# Patient Record
Sex: FEMALE | Race: WHITE | NOT HISPANIC OR LATINO | Employment: FULL TIME | URBAN - NONMETROPOLITAN AREA
[De-identification: names, ages, dates, MRNs, and addresses within clinical notes are randomized per-mention and may not be internally consistent; named-entity substitution may affect disease eponyms.]

---

## 2017-07-19 ENCOUNTER — OFFICE VISIT (OUTPATIENT)
Dept: URGENT CARE | Facility: PHYSICIAN GROUP | Age: 32
End: 2017-07-19
Payer: MEDICAID

## 2017-07-19 VITALS
RESPIRATION RATE: 16 BRPM | OXYGEN SATURATION: 96 % | SYSTOLIC BLOOD PRESSURE: 104 MMHG | DIASTOLIC BLOOD PRESSURE: 70 MMHG | WEIGHT: 215 LBS | HEART RATE: 86 BPM | TEMPERATURE: 97.4 F

## 2017-07-19 DIAGNOSIS — Z72.0 TOBACCO USE: ICD-10-CM

## 2017-07-19 DIAGNOSIS — S46.911A RIGHT SHOULDER STRAIN, INITIAL ENCOUNTER: ICD-10-CM

## 2017-07-19 PROCEDURE — 99406 BEHAV CHNG SMOKING 3-10 MIN: CPT | Performed by: PHYSICIAN ASSISTANT

## 2017-07-19 PROCEDURE — 99203 OFFICE O/P NEW LOW 30 MIN: CPT | Mod: 25 | Performed by: PHYSICIAN ASSISTANT

## 2017-07-19 RX ORDER — CYCLOBENZAPRINE HCL 10 MG
10 TABLET ORAL 3 TIMES DAILY PRN
Qty: 15 TAB | Refills: 0 | Status: SHIPPED | OUTPATIENT
Start: 2017-07-19 | End: 2019-02-27

## 2017-07-19 ASSESSMENT — ENCOUNTER SYMPTOMS
NUMBNESS: 0
BACK PAIN: 1
SENSORY CHANGE: 0
FALLS: 0
TINGLING: 0
MUSCLE WEAKNESS: 0
NECK PAIN: 0
FOCAL WEAKNESS: 0

## 2017-07-19 NOTE — PROGRESS NOTES
Subjective:      Alicia Villegas is a 32 y.o. female who presents with Shoulder Injury            HPI Comments: Patient reports that she was lifting a box yesterday when she felt a pop in her right shoulder. She reports pain throughout the shoulder and right upper back. Pain is worse with movement, palpation, and especially when lifting the right arm overhead. Pain has improved slightly since yesterday but is still bothersome. Also reports some spasms in her right shoulder and neck.    Shoulder Injury   The incident occurred at home. The right shoulder is affected. The incident occurred 12 to 24 hours ago. Injury mechanism: lifting. The quality of the pain is described as aching, cramping and stabbing. The pain does not radiate. The pain is moderate. Pertinent negatives include no chest pain, muscle weakness, numbness or tingling. The symptoms are aggravated by movement, palpation and overhead lifting. She has tried acetaminophen, NSAIDs and rest for the symptoms. The treatment provided mild relief.       Review of Systems   Cardiovascular: Negative for chest pain.   Musculoskeletal: Positive for back pain. Negative for falls and neck pain.   Neurological: Negative for tingling, sensory change, focal weakness and numbness.     Allergies:Review of patient's allergies indicates no known allergies.    Current Outpatient Prescriptions Ordered in [facility]   Medication Sig Dispense Refill   • cyclobenzaprine (FLEXERIL) 10 MG Tab Take 1 Tab by mouth 3 times a day as needed. 15 Tab 0     No current Epic-ordered facility-administered medications on file.       History reviewed. No pertinent past medical history.    Social History   Substance Use Topics   • Smoking status: Current Every Day Smoker     Types: Cigarettes   • Smokeless tobacco: Never Used   • Alcohol Use: Yes       No family status information on file.   History reviewed. No pertinent family history.       Objective:     /70 mmHg  Pulse 86  Temp(Src) 36.3 °C  (97.4 °F)  Resp 16  Wt 97.523 kg (215 lb)  SpO2 96%     Physical Exam   Constitutional: She is oriented to person, place, and time. She appears well-developed and well-nourished. No distress.   HENT:   Head: Normocephalic and atraumatic.   Eyes: Right eye exhibits no discharge. Left eye exhibits no discharge.   Neck: Normal range of motion. Neck supple.   Cardiovascular: Normal rate.    Pulmonary/Chest: Effort normal.   Musculoskeletal:   Mild to moderate muscular tenderness of the posterior aspect of the right shoulder and right trapezius   Neurological: She is alert and oriented to person, place, and time.   Skin: Skin is warm and dry. She is not diaphoretic.   Psychiatric: She has a normal mood and affect. Her behavior is normal. Judgment and thought content normal.   Nursing note and vitals reviewed.              Assessment/Plan:     1. Right shoulder strain, initial encounter  cyclobenzaprine (FLEXERIL) 10 MG Tab    Muscular tenderness of the posterior shoulder and upper back. No bony tenderness. Likely strain. NSAIDs, heat, massage, Flexeril.   2. Tobacco use  REFERRAL TO TOBACCO CESSATION PROGRAM    Chronic problem. Discussed smoking cessation and given referral to tobacco cessation program.   Ready to quit: Yes  Counseling given: Yes        [employer] Interactive Patient Education given: Shoulder pain, smoking cessation    Please note that this dictation was created using voice recognition software. I have made every reasonable attempt to correct obvious errors, but I expect that there are errors of grammar and possibly content that I did not discover before finalizing the note.

## 2017-07-19 NOTE — PATIENT INSTRUCTIONS
Shoulder Pain  The shoulder is the joint that connects your arms to your body. The bones that form the shoulder joint include the upper arm bone (humerus), the shoulder blade (scapula), and the collarbone (clavicle). The top of the humerus is shaped like a ball and fits into a rather flat socket on the scapula (glenoid cavity). A combination of muscles and strong, fibrous tissues that connect muscles to bones (tendons) support your shoulder joint and hold the ball in the socket. Small, fluid-filled sacs (bursae) are located in different areas of the joint. They act as cushions between the bones and the overlying soft tissues and help reduce friction between the gliding tendons and the bone as you move your arm. Your shoulder joint allows a wide range of motion in your arm. This range of motion allows you to do things like scratch your back or throw a ball. However, this range of motion also makes your shoulder more prone to pain from overuse and injury.  Causes of shoulder pain can originate from both injury and overuse and usually can be grouped in the following four categories:  · Redness, swelling, and pain (inflammation) of the tendon (tendinitis) or the bursae (bursitis).  · Instability, such as a dislocation of the joint.  · Inflammation of the joint (arthritis).  · Broken bone (fracture).  HOME CARE INSTRUCTIONS   · Apply ice to the sore area.  ¨ Put ice in a plastic bag.  ¨ Place a towel between your skin and the bag.  ¨ Leave the ice on for 15-20 minutes, 3-4 times per day for the first 2 days, or as directed by your health care provider.  · Stop using cold packs if they do not help with the pain.  · If you have a shoulder sling or immobilizer, wear it as long as your caregiver instructs. Only remove it to shower or bathe. Move your arm as little as possible, but keep your hand moving to prevent swelling.  · Squeeze a soft ball or foam pad as much as possible to help prevent swelling.  · Only take  over-the-counter or prescription medicines for pain, discomfort, or fever as directed by your caregiver.  SEEK MEDICAL CARE IF:   · Your shoulder pain increases, or new pain develops in your arm, hand, or fingers.  · Your hand or fingers become cold and numb.  · Your pain is not relieved with medicines.  SEEK IMMEDIATE MEDICAL CARE IF:   · Your arm, hand, or fingers are numb or tingling.  · Your arm, hand, or fingers are significantly swollen or turn white or blue.  MAKE SURE YOU:   · Understand these instructions.  · Will watch your condition.  · Will get help right away if you are not doing well or get worse.     This information is not intended to replace advice given to you by your health care provider. Make sure you discuss any questions you have with your health care provider.     Document Released: 09/27/2006 Document Revised: 01/08/2016 Document Reviewed: 04/11/2016  [employer] Interactive Patient Education ©2016 [employer]      Smoking Cessation, Tips for Success  If you are ready to quit smoking, congratulations! You have chosen to help yourself be healthier. Cigarettes bring nicotine, tar, carbon monoxide, and other irritants into your body. Your lungs, heart, and blood vessels will be able to work better without these poisons. There are many different ways to quit smoking. Nicotine gum, nicotine patches, a nicotine inhaler, or nicotine nasal spray can help with physical craving. Hypnosis, support groups, and medicines help break the habit of smoking.  WHAT THINGS CAN I DO TO MAKE QUITTING EASIER?   Here are some tips to help you quit for good:  · Pick a date when you will quit smoking completely. Tell all of your friends and family about your plan to quit on that date.  · Do not try to slowly cut down on the number of cigarettes you are smoking. Pick a quit date and quit smoking completely starting on that day.  · Throw away all cigarettes.    · Clean and remove all ashtrays from your home, work, and  "car.  · On a card, write down your reasons for quitting. Carry the card with you and read it when you get the urge to smoke.  · Cleanse your body of nicotine. Drink enough water and fluids to keep your urine clear or pale yellow. Do this after quitting to flush the nicotine from your body.  · Learn to predict your moods. Do not let a bad situation be your excuse to have a cigarette. Some situations in your life might tempt you into wanting a cigarette.  · Never have \"just one\" cigarette. It leads to wanting another and another. Remind yourself of your decision to quit.  · Change habits associated with smoking. If you smoked while driving or when feeling stressed, try other activities to replace smoking. Stand up when drinking your coffee. Brush your teeth after eating. Sit in a different chair when you read the paper. Avoid alcohol while trying to quit, and try to drink fewer caffeinated beverages. Alcohol and caffeine may urge you to smoke.  · Avoid foods and drinks that can trigger a desire to smoke, such as sugary or spicy foods and alcohol.  · Ask people who smoke not to smoke around you.  · Have something planned to do right after eating or having a cup of coffee. For example, plan to take a walk or exercise.  · Try a relaxation exercise to calm you down and decrease your stress. Remember, you may be tense and nervous for the first 2 weeks after you quit, but this will pass.  · Find new activities to keep your hands busy. Play with a pen, coin, or rubber band. Doodle or draw things on paper.  · Brush your teeth right after eating. This will help cut down on the craving for the taste of tobacco after meals. You can also try mouthwash.    · Use oral substitutes in place of cigarettes. Try using lemon drops, carrots, cinnamon sticks, or chewing gum. Keep them handy so they are available when you have the urge to smoke.  · When you have the urge to smoke, try deep breathing.  · Designate your home as a nonsmoking " "area.  · If you are a heavy smoker, ask your health care provider about a prescription for nicotine chewing gum. It can ease your withdrawal from nicotine.  · Reward yourself. Set aside the cigarette money you save and buy yourself something nice.  · Look for support from others. Join a support group or smoking cessation program. Ask someone at home or at work to help you with your plan to quit smoking.  · Always ask yourself, \"Do I need this cigarette or is this just a reflex?\" Tell yourself, \"Today, I choose not to smoke,\" or \"I do not want to smoke.\" You are reminding yourself of your decision to quit.  · Do not replace cigarette smoking with electronic cigarettes (commonly called e-cigarettes). The safety of e-cigarettes is unknown, and some may contain harmful chemicals.  · If you relapse, do not give up! Plan ahead and think about what you will do the next time you get the urge to smoke.  HOW WILL I FEEL WHEN I QUIT SMOKING?  You may have symptoms of withdrawal because your body is used to nicotine (the addictive substance in cigarettes). You may crave cigarettes, be irritable, feel very hungry, cough often, get headaches, or have difficulty concentrating. The withdrawal symptoms are only temporary. They are strongest when you first quit but will go away within 10-14 days. When withdrawal symptoms occur, stay in control. Think about your reasons for quitting. Remind yourself that these are signs that your body is healing and getting used to being without cigarettes. Remember that withdrawal symptoms are easier to treat than the major diseases that smoking can cause.   Even after the withdrawal is over, expect periodic urges to smoke. However, these cravings are generally short lived and will go away whether you smoke or not. Do not smoke!  WHAT RESOURCES ARE AVAILABLE TO HELP ME QUIT SMOKING?  Your health care provider can direct you to community resources or hospitals for support, which may include:  · Group " support.  · Education.  · Hypnosis.  · Therapy.     This information is not intended to replace advice given to you by your health care provider. Make sure you discuss any questions you have with your health care provider.     Document Released: 09/15/2005 Document Revised: 01/08/2016 Document Reviewed: 06/05/2014  [employer] Interactive Patient Education ©2016 [employer]

## 2017-07-19 NOTE — MR AVS SNAPSHOT
Alicia Villegas   2017 1:55 PM   Office Visit   MRN: [id number]    Department:  [facility]   Dept Phone:  [phone]    Description:  Female : 1985   Provider:  LUIS CARLOS Garcia           Reason for Visit     Shoulder Injury R/ shoulder, Pt felt pop while lifting yesterday      Allergies as of 2017     No Known Allergies      You were diagnosed with     Right shoulder strain, initial encounter   [[id number]]   Muscular tenderness of the posterior shoulder and upper back. No bony tenderness. Likely strain. NSAIDs, heat, massage, Flexeril.    Tobacco use   [[id number]]         Vital Signs     Blood Pressure Pulse Temperature Respirations Weight Oxygen Saturation    104/70 mmHg 86 36.3 °C (97.4 °F) 16 97.523 kg (215 lb) 96%    Smoking Status                   Current Every Day Smoker           Basic Information     Date Of Birth Sex Race Ethnicity Preferred Language    1985 Female White Non- English      Health Maintenance     Patient has no pending health maintenance at this time      Current Immunizations     No immunizations on file.      Below and/or attached are the medications your provider expects you to take. Review all of your home medications and newly ordered medications with your provider and/or pharmacist. Follow medication instructions as directed by your provider and/or pharmacist. Please keep your medication list with you and share with your provider. Update the information when medications are discontinued, doses are changed, or new medications (including over-the-counter products) are added; and carry medication information at all times in the event of emergency situations     Allergies:  No Known Allergies          Medications  Valid as of: 2017 -  2:07 PM    Generic Name Brand Name Tablet Size Instructions for use    Cyclobenzaprine HCl (Tab) FLEXERIL 10 MG Take 1 Tab by mouth 3 times a day as needed.        .                 Medicines prescribed  today were sent to:     [facility] [address] [place] [address], [place] - [address] [facility]    [address] [facility] [place] [address]    Phone: [phone] Fax: [phone]    Open 24 Hours?: No      Medication refill instructions:       If your prescription bottle indicates you have medication refills left, it is not necessary to call your provider’s office. Please contact your pharmacy and they will refill your medication.    If your prescription bottle indicates you do not have any refills left, you may request refills at any time through one of the following ways: The online [employer] [facility] system (except Urgent Care), by calling your provider’s office, or by asking your pharmacy to contact your provider’s office with a refill request. Medication refills are processed only during regular business hours and may not be available until the next business day. Your provider may request additional information or to have a follow-up visit with you prior to refilling your medication.   *Please Note: Medication refills are assigned a new Rx number when refilled electronically. Your pharmacy may indicate that no refills were authorized even though a new prescription for the same medication is available at the pharmacy. Please request the medicine by name with the pharmacy before contacting your provider for a refill.        Referral     A referral request has been sent to our patient care coordination department. Please allow 3-5 business days for us to process this request and contact you either by phone or mail. If you do not hear from us by the 5th business day, please call us at [phone].        Instructions    Smoking Cessation, Tips for Success  If you are ready to quit smoking, congratulations! You have chosen to help yourself be healthier. Cigarettes bring nicotine, tar, carbon monoxide, and other irritants into your body. Your lungs, heart, and blood vessels will be able to work better without these poisons. There are many  "different ways to quit smoking. Nicotine gum, nicotine patches, a nicotine inhaler, or nicotine nasal spray can help with physical craving. Hypnosis, support groups, and medicines help break the habit of smoking.  WHAT THINGS CAN I DO TO MAKE QUITTING EASIER?   Here are some tips to help you quit for good:  · Pick a date when you will quit smoking completely. Tell all of your friends and family about your plan to quit on that date.  · Do not try to slowly cut down on the number of cigarettes you are smoking. Pick a quit date and quit smoking completely starting on that day.  · Throw away all cigarettes.    · Clean and remove all ashtrays from your home, work, and car.  · On a card, write down your reasons for quitting. Carry the card with you and read it when you get the urge to smoke.  · Cleanse your body of nicotine. Drink enough water and fluids to keep your urine clear or pale yellow. Do this after quitting to flush the nicotine from your body.  · Learn to predict your moods. Do not let a bad situation be your excuse to have a cigarette. Some situations in your life might tempt you into wanting a cigarette.  · Never have \"just one\" cigarette. It leads to wanting another and another. Remind yourself of your decision to quit.  · Change habits associated with smoking. If you smoked while driving or when feeling stressed, try other activities to replace smoking. Stand up when drinking your coffee. Brush your teeth after eating. Sit in a different chair when you read the paper. Avoid alcohol while trying to quit, and try to drink fewer caffeinated beverages. Alcohol and caffeine may urge you to smoke.  · Avoid foods and drinks that can trigger a desire to smoke, such as sugary or spicy foods and alcohol.  · Ask people who smoke not to smoke around you.  · Have something planned to do right after eating or having a cup of coffee. For example, plan to take a walk or exercise.  · Try a relaxation exercise to calm you " "down and decrease your stress. Remember, you may be tense and nervous for the first 2 weeks after you quit, but this will pass.  · Find new activities to keep your hands busy. Play with a pen, coin, or rubber band. Doodle or draw things on paper.  · Brush your teeth right after eating. This will help cut down on the craving for the taste of tobacco after meals. You can also try mouthwash.    · Use oral substitutes in place of cigarettes. Try using lemon drops, carrots, cinnamon sticks, or chewing gum. Keep them handy so they are available when you have the urge to smoke.  · When you have the urge to smoke, try deep breathing.  · Designate your home as a nonsmoking area.  · If you are a heavy smoker, ask your health care provider about a prescription for nicotine chewing gum. It can ease your withdrawal from nicotine.  · Reward yourself. Set aside the cigarette money you save and buy yourself something nice.  · Look for support from others. Join a support group or smoking cessation program. Ask someone at home or at work to help you with your plan to quit smoking.  · Always ask yourself, \"Do I need this cigarette or is this just a reflex?\" Tell yourself, \"Today, I choose not to smoke,\" or \"I do not want to smoke.\" You are reminding yourself of your decision to quit.  · Do not replace cigarette smoking with electronic cigarettes (commonly called e-cigarettes). The safety of e-cigarettes is unknown, and some may contain harmful chemicals.  · If you relapse, do not give up! Plan ahead and think about what you will do the next time you get the urge to smoke.  HOW WILL I FEEL WHEN I QUIT SMOKING?  You may have symptoms of withdrawal because your body is used to nicotine (the addictive substance in cigarettes). You may crave cigarettes, be irritable, feel very hungry, cough often, get headaches, or have difficulty concentrating. The withdrawal symptoms are only temporary. They are strongest when you first quit but will go " away within 10-14 days. When withdrawal symptoms occur, stay in control. Think about your reasons for quitting. Remind yourself that these are signs that your body is healing and getting used to being without cigarettes. Remember that withdrawal symptoms are easier to treat than the major diseases that smoking can cause.   Even after the withdrawal is over, expect periodic urges to smoke. However, these cravings are generally short lived and will go away whether you smoke or not. Do not smoke!  WHAT RESOURCES ARE AVAILABLE TO HELP ME QUIT SMOKING?  Your health care provider can direct you to community resources or hospitals for support, which may include:  · Group support.  · Education.  · Hypnosis.  · Therapy.     This information is not intended to replace advice given to you by your health care provider. Make sure you discuss any questions you have with your health care provider.     Document Released: 09/15/2005 Document Revised: 01/08/2016 Document Reviewed: 06/05/2014  [employer] Interactive Patient Education ©2016 Elsevier Inc.            Proximus Access Code: [email or web address]  Expires: 8/18/2017  2:07 PM    Proximus  A secure, online tool to manage your health information     [employer]’s Proximus® is a secure, online tool that connects you to your personalized health information from the privacy of your home -- day or night - making it very easy for you to manage your healthcare. Once the activation process is completed, you can even access your medical information using the Proximus jordy, which is available for free in the Apple Jordy store or Google Play store.     Proximus provides the following levels of access (as shown below):   My Chart Features   [facility] Primary [facility] Doctor [facility]  Specialists [facility]  Urgent  Care Non-[facility]  Primary Care  Doctor   Email your healthcare team securely and privately 24/7 X X X    Manage appointments: schedule your next appointment; view details of past/upcoming appointments  X      Request prescription refills. X      View recent personal medical records, including lab and immunizations X X X X   View health record, including health history, allergies, medications X X X X   Read reports about your outpatient visits, procedures, consult and ER notes X X X X   See your discharge summary, which is a recap of your hospital and/or ER visit that includes your diagnosis, lab results, and care plan. X X       How to register for [employer] Online:  1. Go to  [email or web address].  2. Click on the Sign Up Now box, which takes you to the New Member Sign Up page. You will need to provide the following information:  a. Enter your [employer] Online Access Code exactly as it appears at the top of this page. (You will not need to use this code after you’ve completed the sign-up process. If you do not sign up before the expiration date, you must request a new code.)   b. Enter your date of birth.   c. Enter your home email address.   d. Click Submit, and follow the next screen’s instructions.  3. Create a [employer]a Online ID. This will be your [employer] Online login ID and cannot be changed, so think of one that is secure and easy to remember.  4. Create a [employer] Online password. You can change your password at any time.  5. Enter your Password Reset Question and Answer. This can be used at a later time if you forget your password.   6. Enter your e-mail address. This allows you to receive e-mail notifications when new information is available in [employer] Online.  7. Click Sign Up. You can now view your health information.    For assistance activating your [employer] Online account, call [phone]        Quit Tobacco Information     Do you want to quit using tobacco?    Quitting tobacco decreases risks of cancer, heart and lung disease, increases life expectancy, improves sense of taste and smell, and increases spending money, among other benefits.    If you are thinking about quitting, we can help.  • [employer] Quit Tobacco Program:  691.948.7663  o Program occurs weekly for four weeks and includes pharmacist consultation on products to support quitting smoking or chewing tobacco. A provider referral is needed for pharmacist consultation.  • Tobacco Users Help Hotline: 8-800QUIT-NOW (306-6123) or https://[place].quitlogix.org/  o Free, confidential telephone and online coaching for [place] residents. Sessions are designed on a schedule that is convenient for you. Eligible clients receive free nicotine replacement therapy.  • Nationally: www.smokefree.gov  o Information and professional assistance to support both immediate and long-term needs as you become, and remain, a non-smoker. Smokefree.gov allows you to choose the help that best fits your needs.

## 2017-07-19 NOTE — Clinical Note
July 19, 2017         Patient: Alicia Villegas   YOB: 1985   Date of Visit: 7/19/2017           To Whom it May Concern:    Alicia Villegas was seen in my clinic on 7/19/2017. Please excuse from work today.    If you have any questions or concerns, please don't hesitate to call.        Sincerely,           Malik Barragan PA-C  Electronically Signed

## 2018-11-16 ENCOUNTER — OFFICE VISIT (OUTPATIENT)
Dept: URGENT CARE | Facility: CLINIC | Age: 33
End: 2018-11-16

## 2018-11-16 VITALS
TEMPERATURE: 97.9 F | OXYGEN SATURATION: 97 % | RESPIRATION RATE: 14 BRPM | HEART RATE: 72 BPM | DIASTOLIC BLOOD PRESSURE: 70 MMHG | SYSTOLIC BLOOD PRESSURE: 124 MMHG | HEIGHT: 67 IN | BODY MASS INDEX: 26.21 KG/M2 | WEIGHT: 167 LBS

## 2018-11-16 DIAGNOSIS — Z02.1 PRE-EMPLOYMENT DRUG SCREENING: ICD-10-CM

## 2018-11-16 DIAGNOSIS — Z02.1 PHYSICAL EXAM, PRE-EMPLOYMENT: ICD-10-CM

## 2018-11-16 PROCEDURE — 8915 PR COMPREHENSIVE PHYSICAL: Performed by: NURSE PRACTITIONER

## 2018-11-16 PROCEDURE — 8907 PR URINE COLLECT ONLY: Performed by: NURSE PRACTITIONER

## 2018-11-16 ASSESSMENT — ENCOUNTER SYMPTOMS
SPUTUM PRODUCTION: 0
BACK PAIN: 0
DIAPHORESIS: 0
WEAKNESS: 0
PALPITATIONS: 0
HEMOPTYSIS: 0
WHEEZING: 0
SHORTNESS OF BREATH: 0
ORTHOPNEA: 0
COUGH: 0
MYALGIAS: 0
NECK PAIN: 0
FEVER: 0
CHILLS: 0

## 2018-11-16 NOTE — PROGRESS NOTES
"Subjective:      Alicia Villegas is a 33 y.o. female who presents with Employment Physical            Patient comes in today for a pre-employment physical.  Patient denies any history of asthma, cardiovascular problems, seizures or syncope.  Surgical history: umbilical hernia repair and tubal ligation.  Denies any history of broken bones.  Smoker.  Reports she is in general good health and does not anticipate any difficulty performing expected job duties.          Review of Systems   Constitutional: Negative for chills, diaphoresis, fever and malaise/fatigue.   Respiratory: Negative for cough, hemoptysis, sputum production, shortness of breath and wheezing.    Cardiovascular: Negative for chest pain, palpitations, orthopnea and leg swelling.   Musculoskeletal: Negative for back pain, joint pain, myalgias and neck pain.   Neurological: Negative for weakness.     Medications, Allergies, and current problem list reviewed today in Epic     Objective:     /70 (BP Location: Left arm, Patient Position: Sitting, BP Cuff Size: Large adult)   Pulse 72   Temp 36.6 °C (97.9 °F) (Temporal)   Resp 14   Ht 1.702 m (5' 7\")   Wt 75.8 kg (167 lb)   SpO2 97%   BMI 26.16 kg/m²      Physical Exam   Constitutional: She is oriented to person, place, and time. She appears well-developed and well-nourished. No distress.   HENT:   Head: Normocephalic.   Right Ear: External ear normal.   Left Ear: External ear normal.   Nose: Nose normal.   Mouth/Throat: Oropharynx is clear and moist. No oropharyngeal exudate.   Eyes: Pupils are equal, round, and reactive to light. Conjunctivae and EOM are normal. Right eye exhibits no discharge. Left eye exhibits no discharge. No scleral icterus.   Neck: Normal range of motion. Neck supple. No JVD present. No tracheal deviation present. No thyromegaly present.   Cardiovascular: Normal rate, regular rhythm and normal heart sounds.  Exam reveals no gallop and no friction rub.    No murmur " heard.  Pulmonary/Chest: Effort normal and breath sounds normal. No stridor. No respiratory distress. She has no wheezes. She has no rales. She exhibits no tenderness.   Abdominal: Soft. Bowel sounds are normal. She exhibits no distension and no mass. There is no tenderness. There is no rebound and no guarding. No hernia.   Musculoskeletal: Normal range of motion. She exhibits no edema or tenderness.   Lymphadenopathy:     She has no cervical adenopathy.   Neurological: She is alert and oriented to person, place, and time. No cranial nerve deficit. Coordination normal.   Skin: Skin is warm and dry. No rash noted. She is not diaphoretic. No erythema.   Psychiatric: She has a normal mood and affect. Her behavior is normal. Judgment and thought content normal.   Vitals reviewed.    -see scanned forms          Assessment/Plan:     1. Physical exam, pre-employment    2. Pre-employment drug screening    Cleared to work with no restrictions.  See PCP for routine health care, screening, and maintenance.  Patient verbalized understanding of and agreed with plan of care.

## 2019-02-27 ENCOUNTER — HOSPITAL ENCOUNTER (EMERGENCY)
Facility: MEDICAL CENTER | Age: 34
End: 2019-02-27
Attending: EMERGENCY MEDICINE

## 2019-02-27 VITALS
HEART RATE: 81 BPM | SYSTOLIC BLOOD PRESSURE: 122 MMHG | RESPIRATION RATE: 15 BRPM | BODY MASS INDEX: 26.23 KG/M2 | HEIGHT: 67 IN | OXYGEN SATURATION: 100 % | TEMPERATURE: 97.2 F | WEIGHT: 167.11 LBS | DIASTOLIC BLOOD PRESSURE: 68 MMHG

## 2019-02-27 DIAGNOSIS — N30.01 ACUTE CYSTITIS WITH HEMATURIA: ICD-10-CM

## 2019-02-27 LAB
APPEARANCE UR: ABNORMAL
BACTERIA #/AREA URNS HPF: ABNORMAL /HPF
BILIRUB UR QL STRIP.AUTO: NEGATIVE
CAOX CRY #/AREA URNS HPF: ABNORMAL /HPF
COLOR UR: YELLOW
EPI CELLS #/AREA URNS HPF: ABNORMAL /HPF
GLUCOSE UR STRIP.AUTO-MCNC: NEGATIVE MG/DL
HCG UR QL: NEGATIVE
KETONES UR STRIP.AUTO-MCNC: ABNORMAL MG/DL
LEUKOCYTE ESTERASE UR QL STRIP.AUTO: ABNORMAL
MICRO URNS: ABNORMAL
NITRITE UR QL STRIP.AUTO: POSITIVE
PH UR STRIP.AUTO: 5 [PH]
PROT UR QL STRIP: NEGATIVE MG/DL
RBC # URNS HPF: ABNORMAL /HPF
RBC UR QL AUTO: NEGATIVE
SP GR UR REFRACTOMETRY: 1.02
SP GR UR STRIP.AUTO: 1.02
UROBILINOGEN UR STRIP.AUTO-MCNC: 0.2 MG/DL
WBC #/AREA URNS HPF: ABNORMAL /HPF

## 2019-02-27 PROCEDURE — 81001 URINALYSIS AUTO W/SCOPE: CPT

## 2019-02-27 PROCEDURE — 700102 HCHG RX REV CODE 250 W/ 637 OVERRIDE(OP): Performed by: EMERGENCY MEDICINE

## 2019-02-27 PROCEDURE — 87086 URINE CULTURE/COLONY COUNT: CPT

## 2019-02-27 PROCEDURE — 96372 THER/PROPH/DIAG INJ SC/IM: CPT

## 2019-02-27 PROCEDURE — A9270 NON-COVERED ITEM OR SERVICE: HCPCS | Performed by: EMERGENCY MEDICINE

## 2019-02-27 PROCEDURE — 81025 URINE PREGNANCY TEST: CPT

## 2019-02-27 PROCEDURE — 700111 HCHG RX REV CODE 636 W/ 250 OVERRIDE (IP): Performed by: EMERGENCY MEDICINE

## 2019-02-27 PROCEDURE — 99284 EMERGENCY DEPT VISIT MOD MDM: CPT

## 2019-02-27 PROCEDURE — 87186 SC STD MICRODIL/AGAR DIL: CPT

## 2019-02-27 PROCEDURE — 87077 CULTURE AEROBIC IDENTIFY: CPT

## 2019-02-27 PROCEDURE — 700101 HCHG RX REV CODE 250: Performed by: EMERGENCY MEDICINE

## 2019-02-27 RX ORDER — CEFTRIAXONE SODIUM 250 MG/1
250 INJECTION, POWDER, FOR SOLUTION INTRAMUSCULAR; INTRAVENOUS ONCE
Status: COMPLETED | OUTPATIENT
Start: 2019-02-27 | End: 2019-02-27

## 2019-02-27 RX ORDER — METRONIDAZOLE 500 MG/1
2000 TABLET ORAL ONCE
Status: COMPLETED | OUTPATIENT
Start: 2019-02-27 | End: 2019-02-27

## 2019-02-27 RX ORDER — ACETAMINOPHEN 325 MG/1
650 TABLET ORAL ONCE
Status: COMPLETED | OUTPATIENT
Start: 2019-02-27 | End: 2019-02-27

## 2019-02-27 RX ORDER — SULFAMETHOXAZOLE AND TRIMETHOPRIM 800; 160 MG/1; MG/1
1 TABLET ORAL 2 TIMES DAILY
Qty: 20 TAB | Refills: 0 | Status: SHIPPED | OUTPATIENT
Start: 2019-02-27 | End: 2019-03-09

## 2019-02-27 RX ORDER — AZITHROMYCIN 250 MG/1
1000 TABLET, FILM COATED ORAL ONCE
Status: COMPLETED | OUTPATIENT
Start: 2019-02-27 | End: 2019-02-27

## 2019-02-27 RX ORDER — PHENAZOPYRIDINE HYDROCHLORIDE 200 MG/1
200 TABLET, FILM COATED ORAL 3 TIMES DAILY PRN
Qty: 6 TAB | Refills: 0 | Status: SHIPPED | OUTPATIENT
Start: 2019-02-27 | End: 2020-03-11

## 2019-02-27 RX ADMIN — ACETAMINOPHEN 650 MG: 325 TABLET, FILM COATED ORAL at 17:18

## 2019-02-27 RX ADMIN — CEFTRIAXONE SODIUM 250 MG: 250 INJECTION, POWDER, FOR SOLUTION INTRAMUSCULAR; INTRAVENOUS at 18:08

## 2019-02-27 RX ADMIN — LIDOCAINE HYDROCHLORIDE 0.9 ML: 10 INJECTION, SOLUTION INFILTRATION; PERINEURAL at 18:09

## 2019-02-27 RX ADMIN — METRONIDAZOLE 2000 MG: 500 TABLET ORAL at 18:08

## 2019-02-27 RX ADMIN — AZITHROMYCIN 1000 MG: 250 TABLET, FILM COATED ORAL at 18:08

## 2019-02-27 ASSESSMENT — PAIN DESCRIPTION - DESCRIPTORS: DESCRIPTORS: SHARP;BURNING

## 2019-02-27 NOTE — LETTER
3/1/2019               Alicia Villegas  [address] [facility] [address]        Dear Alicia (MR#[id number])    This letter is sent in regards to your, recent visit to the [facility] Emergency Department on 2/27/2019.  During the visit, tests were performed to assist the physician in a medical diagnosis.  A review of those tests requires that we notify you of the following:    Your urine culture was POSITIVE for a bacteria called Escherichia coli. The antibiotic prescribed for you (sulfamethoxazole-trimethoprim) should be active to treat this bacteria. IT IS IMPORTANT THAT YOU CONTINUE TAKING YOUR ANTIBIOTIC UNTIL IT IS FINISHED.      Please feel free to contact me at the number below if you have any questions or concerns. Thank you for your cooperation in the matter.    Sincerely,  ED Culture Follow-Up Staff  Amita Alejandre, PharmD    [facility] Emergency Department  [address] [place] [id number]  [phone] (ED Culture Line)  [phone]

## 2019-02-28 NOTE — ED TRIAGE NOTES
Alicia Villegas  Chief Complaint   Patient presents with   • Flank Pain     right sided, increasing since last night.    • Painful Urination   • Blood in Urine     Pt ambulatory to triage with above complaint.  VSS. Specimen cup given and instructed on clean catch urine sample.  Pt returned to [facility], educated on triage process, and to inform staff of any changes or concerns.

## 2019-02-28 NOTE — ED PROVIDER NOTES
"ED Provider Note    Scribed for Edwardo Bledsoe M.D. by Tisha Nichols. 2019, 5:11 PM.    Primary care provider: Lc Casas M.D.  Means of arrival: walk in   History obtained from: Patient  History limited by: none     CHIEF COMPLAINT  Chief Complaint   Patient presents with   • Flank Pain     right sided, increasing since last night.    • Painful Urination   • Blood in Urine       HPI  Alicia Villegas is a 33 y.o. female who presents to the Emergency Department with complaints of acute, constant, right sided flank pain onset 1 day ago. The patient reports associated dysuria, and hematuria, but denies foul smelling urine. In attempts to alleviate her pain, she has been taking Ibuprofen which has provided mild relief. She denies any recent falls, injury or trauma. The patient has had urinary tract infections in the past. Her obstetric history is A0. The patient notes that she has had increased vaginal bleeding lately and states that she has had what seems to be 2 menstrual periods per month. She is otherwise healthy.     REVIEW OF SYSTEMS  See HPI for further details. All other systems are negative.     PAST MEDICAL HISTORY   no diabetes     SURGICAL HISTORY  patient denies any surgical history    SOCIAL HISTORY  Social History   Substance Use Topics   • Smoking status: Current Every Day Smoker     Packs/day: 0.25     Types: Cigarettes   • Smokeless tobacco: Never Used   • Alcohol use Yes      History   Drug Use No       FAMILY HISTORY  History reviewed. No pertinent family history.    CURRENT MEDICATIONS  Reviewed.  See Encounter Summary.     ALLERGIES  No Known Allergies    PHYSICAL EXAM  VITAL SIGNS: /65   Pulse 79   Temp 36.2 °C (97.2 °F) (Temporal)   Resp 16   Ht 1.702 m (5' 7\")   Wt 75.8 kg (167 lb 1.7 oz)   LMP 2019 (Approximate)   SpO2 100%   BMI 26.17 kg/m²   Constitutional: Alert in no apparent distress.  HENT: No signs of trauma, Bilateral external ears normal, Nose " normal.   Eyes: Pupils are equal and reactive, Conjunctiva normal, Non-icteric.   Neck: Normal range of motion, No tenderness, Supple, No stridor.   Cardiovascular: Regular rate and rhythm, no murmurs.   Thorax & Lungs: Normal breath sounds, No respiratory distress, No wheezing, No chest tenderness.   Abdomen: Bowel sounds normal, Soft, minimal suprapubic tenderness, No masses, No pulsatile masses. No peritoneal signs.  Skin: Warm, Dry, No erythema, No rash.   Back: No bony tenderness, minimal right CVA tenderness.   Extremities: Intact distal pulses, No edema, No tenderness, No cyanosis  Musculoskeletal: Good range of motion in all major joints. No tenderness to palpation or major deformities noted.   Neurologic: Alert , Normal motor function, Normal sensory function, No focal deficits noted.   Psychiatric: Affect normal, Judgment normal, Mood normal.     DIAGNOSTIC STUDIES / PROCEDURES     LABS  Results for orders placed or performed during the hospital encounter of 02/27/19   URINALYSIS CULTURE, IF INDICATED   Result Value Ref Range    Color Yellow     Character Cloudy (A)     Specific Gravity 1.024 <1.035    Ph 5.0 5.0 - 8.0    Glucose Negative Negative mg/dL    Ketones Trace (A) Negative mg/dL    Protein Negative Negative mg/dL    Bilirubin Negative Negative    Urobilinogen, Urine 0.2 Negative    Nitrite Positive (A) Negative    Leukocyte Esterase Moderate (A) Negative    Occult Blood Negative Negative    Micro Urine Req Microscopic    BETA-HCG QUALITATIVE URINE   Result Value Ref Range    Beta-Hcg Urine Negative Negative   REFRACTOMETER SG   Result Value Ref Range    Specific Gravity 1.024    URINE MICROSCOPIC (W/UA)   Result Value Ref Range    WBC  (A) /hpf    RBC 2-5 (A) /hpf    Bacteria Many (A) None /hpf    Epithelial Cells Few /hpf    Ca Oxalate Crystal Moderate /hpf       All labs were reviewed by me.        COURSE & MEDICAL DECISION MAKING  Pertinent Labs & Imaging studies reviewed. (See chart for  details)    5:11 PM - Patient seen and examined at bedside. Patient will be treated with Tylenol 650 mg. Ordered urinalysis, refractometer SG, beta HCG qualitative and urine microscopic to evaluate her symptoms.     5:40 PM - I reviewed the patient's lab and imaging results as shown above.     5:53 PM - I reevaluated patient at bedside and discussed diagnostic study results. The patient informed me that her boyfriend was recently treated for Chlamydia and she also needs to be treated. I also discussed the plan for discharge as outlined below.     Decision Making:  This is a 33 y.o. year old female who presents with above complaint.  History consistent with likely UTI versus pyelonephritis.  Urinalysis is positive.  She does not meet criteria for inpatient care but I will give her prescription of antibiotics to cover for possible Lars.  Additionally she has been provided with Pyridium given her dysuria.  She is understanding return precautions and outpatient follow-up as referred.    FINAL IMPRESSION  1. Acute cystitis with hematuria        PRESCRIPTIONS  New Prescriptions    PHENAZOPYRIDINE (PYRIDIUM) 200 MG TAB    Take 1 Tab by mouth 3 times a day as needed.    SULFAMETHOXAZOLE-TRIMETHOPRIM (BACTRIM DS) 800-160 MG TABLET    Take 1 Tab by mouth 2 times a day for 10 days.       FOLLOW UP  Lc Casas M.D.  [address] [facility] [address]  [phone]              -DISCHARGE-      FINAL IMPRESSION  1. Acute cystitis with hematuria          Tisha OLIVA (Scribciaran), am scribing for, and in the presence of, Edwardo Bledsoe M.D..    Electronically signed by: Tisha Nichols (Florecita), 2/27/2019    Edwardo OLIVA M.D. personally performed the services described in this documentation, as scribed by Tisha Nichols in my presence, and it is both accurate and complete.    C    The note accurately reflects work and decisions made by me.  Edwardo Bledsoe  2/27/2019  7:34 PM

## 2019-02-28 NOTE — ED NOTES
Pt discharged home as ordered by erp. Pt instructed to follow up with their PCP and return here as need. Pt given RX. Pt verbalized understanding and left ambulating independently with family

## 2019-02-28 NOTE — ED NOTES
Pt brought back to rm PUR 75 from triage. Pt able to transfer self to bed, call light in reach. ERP at bedside.

## 2019-03-01 LAB
BACTERIA UR CULT: ABNORMAL
BACTERIA UR CULT: ABNORMAL
SIGNIFICANT IND 70042: ABNORMAL
SITE SITE: ABNORMAL
SOURCE SOURCE: ABNORMAL

## 2019-03-01 NOTE — ED NOTES
"ED Positive Culture Follow-up/Notification Note:    Date: 3/1/19     Patient seen in the ED on 2/27/2019 for acute, constant, right sided flank pain x 1 days with dysuria and hematuria. Also, boyfriend recently treated for Chlamydia and she needs treatment.  1. Acute cystitis with hematuria      Given Rocephin 250 mg IM, Azithromycin 1 g po, and Metronidazole 2g po in the ER.    Discharge Medication List as of 2/27/2019  6:12 PM      START taking these medications    Details   sulfamethoxazole-trimethoprim (BACTRIM DS) 800-160 MG tablet Take 1 Tab by mouth 2 times a day for 10 days., Disp-20 Tab, R-0, Print Rx Paper      phenazopyridine (PYRIDIUM) 200 MG Tab Take 1 Tab by mouth 3 times a day as needed., Disp-6 Tab, R-0, Print Rx Paper             Allergies: Patient has no known allergies.     Vitals:    02/27/19 1646 02/27/19 1651 02/27/19 1813   BP: 115/65  122/68   Pulse: 79  81   Resp: 16  15   Temp: 36.2 °C (97.2 °F)     TempSrc: Temporal     SpO2: 100%     Weight:  75.8 kg (167 lb 1.7 oz)    Height: 1.702 m (5' 7\")         Final cultures:   Results     Procedure Component Value Units Date/Time    URINE CULTURE(NEW) [[id number]]  (Abnormal)  (Susceptibility) Collected:  02/27/19 1720    Order Status:  Completed Specimen:  Urine Updated:  03/01/19 0859     Significant Indicator POS (POS)     Source UR     Site -     Urine Culture - (A)      Escherichia coli  >100,000 cfu/mL   (A)    Culture & Susceptibility     ESCHERICHIA COLI     Antibiotic Sensitivity Microscan Unit Status    Ampicillin Sensitive <=8 mcg/mL Final    Method: EUGENE    Cefepime Sensitive <=8 mcg/mL Final    Method: EUGENE    Cefotaxime Sensitive <=2 mcg/mL Final    Method: EUGENE    Cefotetan Sensitive <=16 mcg/mL Final    Method: EUGENE    Ceftazidime Sensitive <=1 mcg/mL Final    Method: EUGENE    Ceftriaxone Sensitive <=8 mcg/mL Final    Method: EUGENE    Cefuroxime Sensitive <=4 mcg/mL Final    Method: EUGENE    Cephalothin Sensitive <=8 mcg/mL Final    Method: " EUGENE    Ciprofloxacin Resistant >2 mcg/mL Final    Method: EUGENE    Gentamicin Sensitive <=4 mcg/mL Final    Method: EUGENE    Levofloxacin Resistant >4 mcg/mL Final    Method: EUGENE    Nitrofurantoin Sensitive <=32 mcg/mL Final    Method: EUGENE    Pip/Tazobactam Sensitive <=16 mcg/mL Final    Method: EUGENE    Piperacillin Sensitive <=16 mcg/mL Final    Method: EUGENE    Tigecycline Sensitive <=2 mcg/mL Final    Method: EUGENE    Tobramycin Sensitive <=4 mcg/mL Final    Method: EUGENE    Trimeth/Sulfa Sensitive <=2/38 mcg/mL Final    Method: EUGENE                       URINALYSIS CULTURE, IF INDICATED [[id number]]  (Abnormal) Collected:  02/27/19 1720    Order Status:  Completed Specimen:  Urine Updated:  02/27/19 1741     Color Yellow     Character Cloudy (A)     Specific Gravity 1.024     Ph 5.0     Glucose Negative mg/dL      Ketones Trace (A) mg/dL      Protein Negative mg/dL      Bilirubin Negative     Urobilinogen, Urine 0.2     Nitrite Positive (A)     Leukocyte Esterase Moderate (A)     Occult Blood Negative     Micro Urine Req Microscopic          Plan:   Appropriate antibiotic therapy prescribed. No changes required based upon culture result.  Sent letter to patient to notify of positive culture result and encourage compliance with prescribed antibiotics.     Amita Alejandre

## 2020-03-11 ENCOUNTER — OFFICE VISIT (OUTPATIENT)
Dept: URGENT CARE | Facility: PHYSICIAN GROUP | Age: 35
End: 2020-03-11
Payer: MEDICAID

## 2020-03-11 VITALS
DIASTOLIC BLOOD PRESSURE: 74 MMHG | BODY MASS INDEX: 29.03 KG/M2 | WEIGHT: 185 LBS | HEART RATE: 72 BPM | OXYGEN SATURATION: 98 % | TEMPERATURE: 98.6 F | RESPIRATION RATE: 16 BRPM | SYSTOLIC BLOOD PRESSURE: 110 MMHG | HEIGHT: 67 IN

## 2020-03-11 DIAGNOSIS — R10.9 ABDOMINAL PAIN, UNSPECIFIED ABDOMINAL LOCATION: ICD-10-CM

## 2020-03-11 LAB
APPEARANCE UR: CLEAR
BILIRUB UR STRIP-MCNC: NORMAL MG/DL
COLOR UR AUTO: NORMAL
GLUCOSE UR STRIP.AUTO-MCNC: NORMAL MG/DL
INT CON NEG: NEGATIVE
INT CON POS: POSITIVE
KETONES UR STRIP.AUTO-MCNC: NORMAL MG/DL
LEUKOCYTE ESTERASE UR QL STRIP.AUTO: NORMAL
NITRITE UR QL STRIP.AUTO: NORMAL
PH UR STRIP.AUTO: 7.5 [PH] (ref 5–8)
POC URINE PREGNANCY TEST: NEGATIVE
PROT UR QL STRIP: NORMAL MG/DL
RBC UR QL AUTO: NORMAL
SP GR UR STRIP.AUTO: 1.02
UROBILINOGEN UR STRIP-MCNC: NORMAL MG/DL

## 2020-03-11 PROCEDURE — 99214 OFFICE O/P EST MOD 30 MIN: CPT | Mod: 25 | Performed by: NURSE PRACTITIONER

## 2020-03-11 PROCEDURE — 81002 URINALYSIS NONAUTO W/O SCOPE: CPT | Mod: QW | Performed by: NURSE PRACTITIONER

## 2020-03-11 PROCEDURE — 81025 URINE PREGNANCY TEST: CPT | Mod: QW | Performed by: NURSE PRACTITIONER

## 2020-03-11 ASSESSMENT — ENCOUNTER SYMPTOMS
FLANK PAIN: 0
BELCHING: 1
FEVER: 0
HEARTBURN: 0
CHILLS: 1
ANOREXIA: 1
SENSORY CHANGE: 0
DIARRHEA: 0
NAUSEA: 1
VOMITING: 0
WEIGHT LOSS: 0
SORE THROAT: 0
CONSTIPATION: 0
HEMATOCHEZIA: 0
MYALGIAS: 0
ABDOMINAL PAIN: 1
HEADACHES: 0
COUGH: 0

## 2020-03-11 ASSESSMENT — LIFESTYLE VARIABLES: SUBSTANCE_ABUSE: 0

## 2020-03-11 ASSESSMENT — CROHNS DISEASE ACTIVITY INDEX (CDAI): CDAI SCORE: 0

## 2020-03-11 NOTE — PROGRESS NOTES
Subjective:      Alicia Villegas is a 34 y.o. female who presents with Abdominal Pain      Reviewed past medical, surgical and family history. Reviewed prescription and OTC medications with patient in electronic health record today      No Known Allergies       LMP: 2/22/2020    Abdominal Pain   This is a new problem. Episode onset:  1 months  The problem occurs 2 to 4 times per day. The most recent episode lasted 1 hour. The problem has been gradually worsening. The pain is located in the RLQ, LLQ and suprapubic region. The pain is at a severity of 9/10. The pain is moderate. The quality of the pain is sharp and colicky. The abdominal pain radiates to the RUQ and right flank. Associated symptoms include anorexia, belching and nausea. Pertinent negatives include no constipation, diarrhea, dysuria, fever, frequency, headaches, hematochezia, hematuria, melena, myalgias, vomiting or weight loss. Exacerbated by:  a few hours after eating food ( especially greasy or fatty foods)  makes the pain worse.  The pain is relieved by nothing. Treatments tried: ibuprofen, warm pack  The treatment provided mild relief. There is no history of abdominal surgery, Crohn's disease or GERD.   No vaginal discharge. Not sexually active for the past 2 months.- monogomous relationship.   She is currently homeless and eats a lot of fast food, greasy, fatty foods.  She said since she has been homeless the pain in her abdomen has gotten much worse.      Review of Systems   Constitutional: Positive for chills. Negative for fever, malaise/fatigue and weight loss.   HENT: Negative for congestion and sore throat.    Respiratory: Negative for cough.    Cardiovascular: Negative for chest pain.   Gastrointestinal: Positive for abdominal pain, anorexia and nausea. Negative for constipation, diarrhea, heartburn, hematochezia, melena and vomiting.   Genitourinary: Negative for dysuria, flank pain, frequency, hematuria and urgency.        Denies  "vaginal drainage or irritation.   Musculoskeletal: Negative for myalgias.   Neurological: Negative for sensory change and headaches.   Endo/Heme/Allergies: Negative for environmental allergies.   Psychiatric/Behavioral: Negative for substance abuse.          Objective:     /74 (BP Location: Left arm, Patient Position: Sitting, BP Cuff Size: Adult)   Pulse 72   Temp 37 °C (98.6 °F) (Temporal)   Resp 16   Ht 1.702 m (5' 7\")   Wt 83.9 kg (185 lb)   SpO2 98%   BMI 28.98 kg/m²      Physical Exam  Vitals signs and nursing note reviewed.   Constitutional:       General: She is not in acute distress.     Appearance: Normal appearance. She is well-developed and normal weight. She is not ill-appearing or toxic-appearing.   HENT:      Head: Normocephalic.      Right Ear: Hearing normal.      Left Ear: Hearing normal.      Nose: Nose normal. No congestion.      Mouth/Throat:      Mouth: Mucous membranes are moist.      Pharynx: Uvula midline.   Eyes:      General: Lids are normal.      Pupils: Pupils are equal, round, and reactive to light.   Neck:      Musculoskeletal: Full passive range of motion without pain and normal range of motion.      Trachea: Trachea and phonation normal.   Cardiovascular:      Rate and Rhythm: Normal rate and regular rhythm.   Pulmonary:      Effort: Pulmonary effort is normal.      Breath sounds: Normal breath sounds.   Abdominal:      Palpations: Abdomen is soft.      Tenderness: There is generalized abdominal tenderness and tenderness in the right upper quadrant. There is no right CVA tenderness, left CVA tenderness, guarding or rebound. Positive signs include Nugent's sign. Negative signs include Rovsing's sign, McBurney's sign, psoas sign and obturator sign.      Hernia: No hernia is present.       Lymphadenopathy:      Cervical: No cervical adenopathy.      Upper Body:      Right upper body: No supraclavicular adenopathy.      Left upper body: No supraclavicular adenopathy. "   Skin:     General: Skin is warm and dry.      Capillary Refill: Capillary refill takes less than 2 seconds.      Findings: No rash.   Neurological:      Mental Status: She is alert and oriented to person, place, and time.   Psychiatric:         Mood and Affect: Mood normal.         Speech: Speech normal.         Behavior: Behavior normal. Behavior is cooperative.         Thought Content: Thought content normal.          UA: trace leuk        Assessment/Plan:     1. Abdominal pain, unspecified abdominal location  POCT Pregnancy    POCT Urinalysis    US-ABDOMEN COMPLETE SURVEY       US - pending  Avoid greasy / fatty foods if possible.   Return to urgent care clinic or PCP if current symptoms are not resolving in a satisfactory manner or sooner if new or worsening symptoms occur.   Differential diagnosis, natural history, supportive care, and indications for immediate follow-up. Advised of signs and symptoms which would warrant further evaluation and /or emergent evaluation in ER.    Verbalized agreement with this treatment plan and seemed to understand without barriers. Questions were encouraged and answered to satisfaction.           [phone]- okay to leave message

## 2020-03-11 NOTE — PATIENT INSTRUCTIONS
Abdominal Pain, Adult  Abdominal pain can be caused by many things. Often, abdominal pain is not serious and it gets better with no treatment or by being treated at home. However, sometimes abdominal pain is serious. Your health care provider will do a medical history and a physical exam to try to determine the cause of your abdominal pain.  Follow these instructions at home:  · Take over-the-counter and prescription medicines only as told by your health care provider. Do not take a laxative unless told by your health care provider.  · Drink enough fluid to keep your urine clear or pale yellow.  · Watch your condition for any changes.  · Keep all follow-up visits as told by your health care provider. This is important.  Contact a health care provider if:  · Your abdominal pain changes or gets worse.  · You are not hungry or you lose weight without trying.  · You are constipated or have diarrhea for more than 2-3 days.  · You have pain when you urinate or have a bowel movement.  · Your abdominal pain wakes you up at night.  · Your pain gets worse with meals, after eating, or with certain foods.  · You are throwing up and cannot keep anything down.  · You have a fever.  Get help right away if:  · Your pain does not go away as soon as your health care provider told you to expect.  · You cannot stop throwing up.  · Your pain is only in areas of the abdomen, such as the right side or the left lower portion of the abdomen.  · You have bloody or black stools, or stools that look like tar.  · You have severe pain, cramping, or bloating in your abdomen.  · You have signs of dehydration, such as:  ¨ Dark urine, very little urine, or no urine.  ¨ Cracked lips.  ¨ Dry mouth.  ¨ Sunken eyes.  ¨ Sleepiness.  ¨ Weakness.  This information is not intended to replace advice given to you by your health care provider. Make sure you discuss any questions you have with your health care provider.  Document Released: 09/27/2006 Document  Revised: 07/07/2017 Document Reviewed: 05/31/2017  [employer] Interactive Patient Education © 2017 [employer]      Cholecystitis   (we are doing an Ultrasound to see if you have this)     Cholecystitis is inflammation of the gallbladder. It is often called a gallbladder attack. The gallbladder is a pear-shaped organ that lies beneath the liver on the right side of the body. The gallbladder stores bile, which is a fluid that helps the body to digest fats. If bile builds up in your gallbladder, your gallbladder becomes inflamed. This condition may occur suddenly (be acute). Repeat episodes of acute cholecystitis or prolonged episodes may lead to a long-term (chronic) condition. Cholecystitis is serious and it requires treatment.  What are the causes?  The most common cause of this condition is gallstones. Gallstones can block the tube (duct) that carries bile out of your gallbladder. This causes bile to build up. Other causes of this condition include:  · Damage to the gallbladder due to a decrease in blood flow.  · Infections in the bile ducts.  · Scars or kinks in the bile ducts.  · Tumors in the liver, pancreas, or gallbladder.  What increases the risk?  This condition is more likely to develop in:  · People who have sickle cell disease.  · People who take birth control pills or use estrogen.  · People who have alcoholic liver disease.  · People who have liver cirrhosis.  · People who have their nutrition delivered through a vein (parenteral nutrition).  · People who do not eat or drink (do fasting) for a long period of time.  · People who are obese.  · People who have rapid weight loss.  · People who are pregnant.  · People who have increased triglyceride levels.  · People who have pancreatitis.  What are the signs or symptoms?  Symptoms of this condition include:  · Abdominal pain, especially in the upper right area of the abdomen.  · Abdominal tenderness or  bloating.  · Nausea.  · Vomiting.  · Fever.  · Chills.  · Yellowing of the skin and the whites of the eyes (jaundice).  How is this diagnosed?  This condition is diagnosed with a medical history and physical exam. You may also have other tests, including:  · Imaging tests, such as:  ¨ An ultrasound of the gallbladder.  ¨ A CT scan of the abdomen.  ¨ A gallbladder nuclear scan (HIDA scan). This scan allows your health care provider to see the bile moving from your liver to your gallbladder and to your small intestine.  ¨ MRI.  · Blood tests, such as:  ¨ A complete blood count, because the white blood cell count may be higher than normal.  ¨ Liver function tests, because some levels may be higher than normal with certain types of gallstones.  How is this treated?  Treatment may include:  · Fasting for a certain amount of time.  · IV fluids.  · Medicine to treat pain or vomiting.  · Antibiotic medicine.  · Surgery to remove your gallbladder (cholecystectomy). This may happen immediately or at a later time.  Follow these instructions at home:  Home care will depend on your treatment. In general:  · Take over-the-counter and prescription medicines only as told by your health care provider.  · If you were prescribed an antibiotic medicine, take it as told by your health care provider. Do not stop taking the antibiotic even if you start to feel better.  · Follow instructions from your health care provider about what to eat or drink. When you are allowed to eat, avoid eating or drinking anything that triggers your symptoms.  · Keep all follow-up visits as told by your health care provider. This is important.  Contact a health care provider if:  · Your pain is not controlled with medicine.  · You have a fever.  Get help right away if:  · Your pain moves to another part of your abdomen or to your back.  · You continue to have symptoms or you develop new symptoms even with treatment.  This information is not intended to  replace advice given to you by your health care provider. Make sure you discuss any questions you have with your health care provider.  Document Released: 12/18/2006 Document Revised: 04/27/2017 Document Reviewed: 03/30/2016  [employer] Interactive Patient Education © 2017 [employer]

## 2020-03-12 ENCOUNTER — TELEPHONE (OUTPATIENT)
Dept: URGENT CARE | Facility: PHYSICIAN GROUP | Age: 35
End: 2020-03-12

## 2020-03-12 NOTE — TELEPHONE ENCOUNTER
Pt notified of US report.   Continue w. Low fat low grease dietary changes  Keep well hydrated  FU with PCP prn new or worsening symptoms or if symptoms are not resolving in 7-14 days.      Pt verbalized understanding and was in agreement with this plan.

## 2021-10-15 ENCOUNTER — OFFICE VISIT (OUTPATIENT)
Dept: URGENT CARE | Facility: PHYSICIAN GROUP | Age: 36
End: 2021-10-15
Payer: MEDICAID

## 2021-10-15 ENCOUNTER — HOSPITAL ENCOUNTER (OUTPATIENT)
Facility: MEDICAL CENTER | Age: 36
End: 2021-10-15
Attending: PHYSICIAN ASSISTANT
Payer: MEDICAID

## 2021-10-15 VITALS
SYSTOLIC BLOOD PRESSURE: 98 MMHG | HEIGHT: 68 IN | OXYGEN SATURATION: 97 % | HEART RATE: 72 BPM | TEMPERATURE: 99.3 F | WEIGHT: 170 LBS | BODY MASS INDEX: 25.76 KG/M2 | DIASTOLIC BLOOD PRESSURE: 70 MMHG | RESPIRATION RATE: 16 BRPM

## 2021-10-15 DIAGNOSIS — R05.9 COUGH: ICD-10-CM

## 2021-10-15 DIAGNOSIS — J06.9 UPPER RESPIRATORY TRACT INFECTION, UNSPECIFIED TYPE: ICD-10-CM

## 2021-10-15 PROCEDURE — U0003 INFECTIOUS AGENT DETECTION BY NUCLEIC ACID (DNA OR RNA); SEVERE ACUTE RESPIRATORY SYNDROME CORONAVIRUS 2 (SARS-COV-2) (CORONAVIRUS DISEASE [COVID-19]), AMPLIFIED PROBE TECHNIQUE, MAKING USE OF HIGH THROUGHPUT TECHNOLOGIES AS DESCRIBED BY CMS-2020-01-R: HCPCS

## 2021-10-15 PROCEDURE — U0005 INFEC AGEN DETEC AMPLI PROBE: HCPCS

## 2021-10-15 PROCEDURE — 99214 OFFICE O/P EST MOD 30 MIN: CPT | Performed by: PHYSICIAN ASSISTANT

## 2021-10-15 RX ORDER — DEXTROMETHORPHAN HYDROBROMIDE AND PROMETHAZINE HYDROCHLORIDE 15; 6.25 MG/5ML; MG/5ML
5 SYRUP ORAL EVERY 4 HOURS PRN
Qty: 120 ML | Refills: 0 | Status: SHIPPED | OUTPATIENT
Start: 2021-10-15

## 2021-10-15 NOTE — PROGRESS NOTES
"Chief Complaint   Patient presents with   • Sinus Problem     pressure   • Otalgia       HISTORY OF PRESENT ILLNESS: Patient is a 36 y.o. female who presents today because she has a several day history of headache, body aches, sinus pressure, right ear pain and a cough.  She has been taking some over-the-counter medications for symptoms.  She is not vaccinated for Covid.    There are no problems to display for this patient.      Allergies:Patient has no known allergies.    Current Outpatient Medications Ordered in Epic   Medication Sig Dispense Refill   • promethazine-dextromethorphan (PROMETHAZINE-DM) 6.25-15 MG/5ML syrup Take 5 mL by mouth every four hours as needed for Cough. 120 mL 0     No current Epic-ordered facility-administered medications on file.       History reviewed. No pertinent past medical history.    Social History     Tobacco Use   • Smoking status: Current Every Day Smoker     Packs/day: 0.25     Types: Cigarettes   • Smokeless tobacco: Never Used   Vaping Use   • Vaping Use: Never used   Substance Use Topics   • Alcohol use: Not Currently   • Drug use: No       No family status information on file.   History reviewed. No pertinent family history.    ROS:  Review of Systems   Constitutional: Positive for fever, chills, myalgias and malaise/fatigue.   HENT: Positive for right ear pain, no nosebleeds, positive for congestion, no sore throat and neck pain.    Eyes: Negative for blurred vision.   Respiratory: Positive for cough, no sputum production, shortness of breath and wheezing.    Cardiovascular: Negative for chest pain, palpitations, orthopnea and leg swelling.   Gastrointestinal: Negative for heartburn, nausea, vomiting and abdominal pain.   Genitourinary: Negative for dysuria, urgency and frequency.     Exam:  BP (!) 98/70   Pulse 72   Temp 37.4 °C (99.3 °F) (Temporal)   Resp 16   Ht 1.727 m (5' 8\")   Wt 77.1 kg (170 lb)   SpO2 97%   General:  Well nourished, well developed female in " NAD  Head:Normocephalic, atraumatic  Eyes: PERRLA, EOM within normal limits, no conjunctival injection, no scleral icterus, visual fields and acuity grossly intact.  Ears: Normal shape and symmetry, no tenderness, no discharge. External canals are without any significant edema or erythema. Tympanic membranes are without any inflammation, small amount of cloudy fluid behind the right tympanic membrane. Gross auditory acuity is intact  Nose: Symmetrical without tenderness, no discharge.  Mouth: reasonable hygiene, no erythema exudates or tonsillar enlargement.  Neck: no masses, range of motion within normal limits, no tracheal deviation. No obvious thyroid enlargement.  Pulmonary: chest is symmetrical with respiration, no wheezes, crackles, or rhonchi.  Cardiovascular: regular rate and rhythm without murmurs, rubs, or gallops.  Extremities: no clubbing, cyanosis, or edema.    Please note that this dictation was created using voice recognition software. I have made every reasonable attempt to correct obvious errors, but I expect that there are errors of grammar and possibly content that I did not discover before finalizing the note.    Assessment/Plan:  1. Upper respiratory tract infection, unspecified type  SARS-CoV-2 PCR (24 hour In-House): Collect NP swab in VTM   2. Cough  promethazine-dextromethorphan (PROMETHAZINE-DM) 6.25-15 MG/5ML syrup    SARS-CoV-2 PCR (24 hour In-House): Collect NP swab in VTM   Discussed over-the-counter symptomatic relief, strict isolation until Covid test returns    Followup with primary care in the next 7-10 days if not significantly improving, return to the urgent care or go to the emergency room sooner for any worsening of symptoms.

## 2021-10-16 DIAGNOSIS — R05.9 COUGH: ICD-10-CM

## 2021-10-16 DIAGNOSIS — J06.9 UPPER RESPIRATORY TRACT INFECTION, UNSPECIFIED TYPE: ICD-10-CM

## 2021-10-16 LAB — COVID ORDER STATUS COVID19: NORMAL

## 2021-10-17 LAB
SARS-COV-2 RNA RESP QL NAA+PROBE: NOTDETECTED
SPECIMEN SOURCE: NORMAL

## 2021-10-18 ENCOUNTER — TELEPHONE (OUTPATIENT)
Dept: URGENT CARE | Facility: PHYSICIAN GROUP | Age: 36
End: 2021-10-18

## 2021-10-19 NOTE — TELEPHONE ENCOUNTER
----- Message from Tj Llanos P.A.-C. sent at 10/17/2021  1:12 PM PDT -----  Please notify patient COVID NEGATIVE

## 2024-09-13 ENCOUNTER — OFFICE VISIT (OUTPATIENT)
Dept: URGENT CARE | Facility: PHYSICIAN GROUP | Age: 39
End: 2024-09-13
Payer: MEDICAID

## 2024-09-13 VITALS
OXYGEN SATURATION: 97 % | SYSTOLIC BLOOD PRESSURE: 120 MMHG | RESPIRATION RATE: 16 BRPM | DIASTOLIC BLOOD PRESSURE: 70 MMHG | HEIGHT: 68 IN | BODY MASS INDEX: 24.1 KG/M2 | WEIGHT: 159 LBS | TEMPERATURE: 98.4 F | HEART RATE: 76 BPM

## 2024-09-13 DIAGNOSIS — S39.012A STRAIN OF LUMBAR REGION, INITIAL ENCOUNTER: ICD-10-CM

## 2024-09-13 PROCEDURE — 3078F DIAST BP <80 MM HG: CPT | Performed by: PHYSICIAN ASSISTANT

## 2024-09-13 PROCEDURE — 99214 OFFICE O/P EST MOD 30 MIN: CPT | Performed by: PHYSICIAN ASSISTANT

## 2024-09-13 PROCEDURE — 1125F AMNT PAIN NOTED PAIN PRSNT: CPT | Performed by: PHYSICIAN ASSISTANT

## 2024-09-13 PROCEDURE — 3074F SYST BP LT 130 MM HG: CPT | Performed by: PHYSICIAN ASSISTANT

## 2024-09-13 RX ORDER — KETOROLAC TROMETHAMINE 15 MG/ML
15 INJECTION, SOLUTION INTRAMUSCULAR; INTRAVENOUS ONCE
Status: COMPLETED | OUTPATIENT
Start: 2024-09-13 | End: 2024-09-13

## 2024-09-13 RX ORDER — CYCLOBENZAPRINE HCL 10 MG
10 TABLET ORAL
Qty: 7 TABLET | Refills: 0 | Status: SHIPPED | OUTPATIENT
Start: 2024-09-13 | End: 2024-09-20

## 2024-09-13 RX ADMIN — KETOROLAC TROMETHAMINE 15 MG: 15 INJECTION, SOLUTION INTRAMUSCULAR; INTRAVENOUS at 14:21

## 2024-09-13 ASSESSMENT — ENCOUNTER SYMPTOMS
FLANK PAIN: 0
WEAKNESS: 0
ABDOMINAL PAIN: 0
BOWEL INCONTINENCE: 0
HEADACHES: 0
NUMBNESS: 0
WEIGHT LOSS: 0
BACK PAIN: 1
PARESTHESIAS: 1
FEVER: 0
TINGLING: 0
PARESIS: 0
LEG PAIN: 0
PERIANAL NUMBNESS: 0

## 2024-09-13 ASSESSMENT — PAIN SCALES - GENERAL: PAINLEVEL: 8=MODERATE-SEVERE PAIN

## 2024-09-13 NOTE — PROGRESS NOTES
Subjective:   Alicia Villegas is a 39 y.o. female who presents today with   Chief Complaint   Patient presents with    Pain     Was sleeping 3 days ago and felt something pop in right hip/back area. 3 days of still pain and worsening. Couldn't get out of bed.        Back Pain  This is a new problem. Episode onset: 3 days. The problem occurs constantly. The problem has been gradually improving since onset. The pain is present in the lumbar spine. The quality of the pain is described as shooting and aching. The pain is moderate. The symptoms are aggravated by position, bending and twisting. Associated symptoms include paresthesias. Pertinent negatives include no abdominal pain, bladder incontinence, bowel incontinence, chest pain, dysuria, fever, headaches, leg pain, numbness, paresis, pelvic pain, perianal numbness, tingling, weakness or weight loss. She has tried NSAIDs and ice for the symptoms. The treatment provided mild relief.     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  She states she was laying in bed Tuesday and was stretching her leg and felt a pop in her lower back and had pain after that.  Has slightly subsided some.    PMH:  has no past medical history on file.  MEDS:   Current Outpatient Medications:     cyclobenzaprine (FLEXERIL) 10 mg Tab, Take 1 Tablet by mouth at bedtime as needed for Muscle Spasms or Moderate Pain for up to 7 days., Disp: 7 Tablet, Rfl: 0    Current Facility-Administered Medications:     ketorolac (Toradol) 15 MG/ML injection 15 mg, 15 mg, Intravenous, Once,   ALLERGIES: No Known Allergies  SURGHX: History reviewed. No pertinent surgical history.  SOCHX:  reports that she has been smoking cigarettes. She has never used smokeless tobacco. She reports that she does not currently use alcohol. She reports that she does not use drugs.  FH: Reviewed with patient, not pertinent to this visit.       Review of  "Systems   Constitutional:  Negative for fever and weight loss.   Cardiovascular:  Negative for chest pain.   Gastrointestinal:  Negative for abdominal pain and bowel incontinence.   Genitourinary:  Negative for bladder incontinence, dysuria, flank pain, frequency, hematuria, pelvic pain and urgency.   Musculoskeletal:  Positive for back pain.   Neurological:  Positive for paresthesias. Negative for tingling, weakness, numbness and headaches.        Objective:   /70   Pulse 76   Temp 36.9 °C (98.4 °F) (Temporal)   Resp 16   Ht 1.727 m (5' 8\")   Wt 72.1 kg (159 lb)   SpO2 97%   BMI 24.18 kg/m²   Physical Exam  Vitals and nursing note reviewed.   Constitutional:       General: She is not in acute distress.     Appearance: Normal appearance. She is well-developed. She is not ill-appearing or toxic-appearing.   HENT:      Head: Normocephalic and atraumatic.      Right Ear: Hearing normal.      Left Ear: Hearing normal.   Cardiovascular:      Rate and Rhythm: Normal rate.   Pulmonary:      Effort: Pulmonary effort is normal.   Musculoskeletal:        Back:       Comments: Patient has full range of motion of the upper extremities.  Is able to move the lower extremities but does have decreased range of motion with the right lower extremity secondary to discomfort in the lower back.  She states she notices tightness in the lower back with movement.  Tenderness to palpation to the paraspinal area of the right lumbar back.  No significant midline spinous process tenderness or step-off.  No bruising or swelling noted to the lumbar area.  Muscle tightness appreciated to this area as well.  Pain with any forward flexion and twisting in the lower back.   Skin:     General: Skin is warm and dry.   Neurological:      Mental Status: She is alert.      Coordination: Coordination normal.   Psychiatric:         Mood and Affect: Mood normal.         Assessment/Plan:   Assessment    1. Strain of lumbar region, initial " encounter  - ketorolac (Toradol) 15 MG/ML injection 15 mg  - cyclobenzaprine (FLEXERIL) 10 mg Tab; Take 1 Tablet by mouth at bedtime as needed for Muscle Spasms or Moderate Pain for up to 7 days.  Dispense: 7 Tablet; Refill: 0      Symptoms and presentation appear consistent with strain of the lower back on the right side.  No acute concerning findings that would warrant any need of imaging.  No red flag signs on exam.  Would suggest use of heat to the area.  Patient agreeable with Toradol shot and understands not to take any further NSAIDs for at least 24 hours.  Would recommend gentle stretching and topical treatment such as Salonpas or IcyHot patches to the area per 's instructions.  Would suggest easing back into lifting and activity.  Patient understands possible side effects of muscle relaxer to take it sparingly as prescribed mostly at night and not before driving or working.    Differential diagnosis, natural history, supportive care, and indications for immediate follow-up discussed.   Patient given instructions and understanding of medications and treatment.    If not improving in 3-5 days, F/U with PCP or return to  if symptoms worsen.    Patient agreeable to plan.      Please note that this dictation was created using voice recognition software. I have made every reasonable attempt to correct obvious errors, but I expect that there are errors of grammar and possibly content that I did not discover before finalizing the note.    Alvaro Jordan PA-C

## 2024-09-13 NOTE — LETTER
September 13, 2024         Patient: Alicia Villegas   YOB: 1985   Date of Visit: 9/13/2024           To Whom it May Concern:    Alicia Villegas was seen in my clinic on 9/13/2024.  Please excuse patient's absence from 9/10 through 9/14.    If you have any questions or concerns, please don't hesitate to call.        Sincerely,           Alvaro Jordan P.A.-C.  Electronically Signed